# Patient Record
Sex: FEMALE | URBAN - METROPOLITAN AREA
[De-identification: names, ages, dates, MRNs, and addresses within clinical notes are randomized per-mention and may not be internally consistent; named-entity substitution may affect disease eponyms.]

---

## 2018-03-24 ENCOUNTER — EMERGENCY (EMERGENCY)
Facility: HOSPITAL | Age: 43
LOS: 1 days | Discharge: HOME | End: 2018-03-24
Attending: EMERGENCY MEDICINE
Payer: COMMERCIAL

## 2018-03-24 VITALS — HEIGHT: 66 IN | WEIGHT: 154.98 LBS

## 2018-03-24 VITALS
DIASTOLIC BLOOD PRESSURE: 72 MMHG | SYSTOLIC BLOOD PRESSURE: 109 MMHG | TEMPERATURE: 98 F | OXYGEN SATURATION: 98 % | HEART RATE: 74 BPM | RESPIRATION RATE: 17 BRPM

## 2018-03-24 PROCEDURE — 12011 RPR F/E/E/N/L/M 2.5 CM/<: CPT

## 2018-03-24 PROCEDURE — 99283 EMERGENCY DEPT VISIT LOW MDM: CPT | Mod: 25

## 2018-03-24 RX ORDER — LACTOBACILLUS ACIDOPHILUS 100MM CELL
2 CAPSULE ORAL
Qty: 60 | Refills: 0 | OUTPATIENT
Start: 2018-03-24 | End: 2018-04-22

## 2018-03-24 RX ADMIN — Medication 300 MILLIGRAM(S): at 22:37

## 2018-03-24 NOTE — ED PROVIDER NOTE - ATTENDING CONTRIBUTION TO CARE
I, Shady Bearden, performed a face to face bedside interview with this patient regarding history of present illness, review of symptoms and relevant past medical, social and family history.  I completed an independent physical examination. I have communicated the patient’s plan of care and disposition with the ACP.  42 year old female presents after being bitten by dog with abrasions to nose and 1 small laceration that did not involve cartilage, repaired, well appearing and pt can monitor the dog which is UTD on shots

## 2018-03-24 NOTE — ED ADULT NURSE NOTE - OBJECTIVE STATEMENT
A+OX4, reports dog bite to nose.  Nose with teeth marks, dried bld, mild swelling and redness.  Denies any other complaints

## 2018-03-24 NOTE — ED PROVIDER NOTE - CARE PLAN
Principal Discharge DX:	Dog bite, initial encounter  Secondary Diagnosis:	Laceration of nose, initial encounter

## 2018-03-24 NOTE — ED PROVIDER NOTE - SKIN, MLM
1cm laceration to the left nostril, skin otherwise normal color for race, warm, dry and intact. No evidence of rash.

## 2018-03-24 NOTE — ED PROVIDER NOTE - MEDICAL DECISION MAKING DETAILS
Laceration repair, and d/c home. Laceration repair, and d/c home. Pt well appearing, not through cartilage, and dog up to date and it is her dog which she can monitor.

## 2018-03-24 NOTE — ED PROVIDER NOTE - OBJECTIVE STATEMENT
41 y/o female presents to the ED for evaluation of animal bite, onset 2030 tonight. Pt states she was bit by her dog to the nose. Pt states she washed out wound. Pt is UTD on rabies vaccines, tetanus less than 1 year ago. Denies epistaxis, and any other acute symptoms and complaints at this time.

## 2018-04-25 PROBLEM — E03.9 HYPOTHYROIDISM, UNSPECIFIED: Chronic | Status: ACTIVE | Noted: 2018-03-24

## 2018-05-03 ENCOUNTER — APPOINTMENT (OUTPATIENT)
Dept: SURGERY | Facility: CLINIC | Age: 43
End: 2018-05-03
Payer: COMMERCIAL

## 2018-05-03 VITALS
TEMPERATURE: 98.3 F | RESPIRATION RATE: 14 BRPM | DIASTOLIC BLOOD PRESSURE: 67 MMHG | BODY MASS INDEX: 32.98 KG/M2 | HEART RATE: 74 BPM | WEIGHT: 168 LBS | HEIGHT: 60 IN | SYSTOLIC BLOOD PRESSURE: 124 MMHG | OXYGEN SATURATION: 96 %

## 2018-05-03 DIAGNOSIS — Z00.00 ENCOUNTER FOR GENERAL ADULT MEDICAL EXAMINATION W/OUT ABNORMAL FINDINGS: ICD-10-CM

## 2018-05-03 DIAGNOSIS — N64.52 NIPPLE DISCHARGE: ICD-10-CM

## 2018-05-03 PROCEDURE — 99204 OFFICE O/P NEW MOD 45 MIN: CPT

## 2018-05-03 RX ORDER — LEVOTHYROXINE SODIUM 0.05 MG/1
50 TABLET ORAL
Refills: 0 | Status: ACTIVE | COMMUNITY

## 2018-05-03 RX ORDER — ESCITALOPRAM OXALATE 20 MG/1
20 TABLET, FILM COATED ORAL
Refills: 0 | Status: ACTIVE | COMMUNITY

## 2018-05-03 RX ORDER — LEVOCETIRIZINE DIHYDROCHLORIDE 5 MG/1
5 TABLET, FILM COATED ORAL
Refills: 0 | Status: ACTIVE | COMMUNITY

## 2018-05-24 ENCOUNTER — EMERGENCY (EMERGENCY)
Facility: HOSPITAL | Age: 43
LOS: 1 days | Discharge: HOME | End: 2018-05-24
Attending: EMERGENCY MEDICINE
Payer: COMMERCIAL

## 2018-05-24 VITALS
SYSTOLIC BLOOD PRESSURE: 136 MMHG | DIASTOLIC BLOOD PRESSURE: 84 MMHG | RESPIRATION RATE: 20 BRPM | HEART RATE: 94 BPM | OXYGEN SATURATION: 97 % | TEMPERATURE: 98 F

## 2018-05-24 VITALS — HEIGHT: 60 IN | WEIGHT: 173.94 LBS

## 2018-05-24 LAB
ALBUMIN SERPL ELPH-MCNC: 4.2 G/DL — SIGNIFICANT CHANGE UP (ref 3.3–5.2)
ALP SERPL-CCNC: 90 U/L — SIGNIFICANT CHANGE UP (ref 40–120)
ALT FLD-CCNC: 24 U/L — SIGNIFICANT CHANGE UP
ANION GAP SERPL CALC-SCNC: 16 MMOL/L — SIGNIFICANT CHANGE UP (ref 5–17)
APPEARANCE UR: CLEAR — SIGNIFICANT CHANGE UP
AST SERPL-CCNC: 21 U/L — SIGNIFICANT CHANGE UP
BASOPHILS # BLD AUTO: 0 K/UL — SIGNIFICANT CHANGE UP (ref 0–0.2)
BASOPHILS NFR BLD AUTO: 0.3 % — SIGNIFICANT CHANGE UP (ref 0–2)
BILIRUB SERPL-MCNC: <0.2 MG/DL — LOW (ref 0.4–2)
BILIRUB UR-MCNC: NEGATIVE — SIGNIFICANT CHANGE UP
BUN SERPL-MCNC: 8 MG/DL — SIGNIFICANT CHANGE UP (ref 8–20)
CALCIUM SERPL-MCNC: 9.3 MG/DL — SIGNIFICANT CHANGE UP (ref 8.6–10.2)
CHLORIDE SERPL-SCNC: 101 MMOL/L — SIGNIFICANT CHANGE UP (ref 98–107)
CO2 SERPL-SCNC: 25 MMOL/L — SIGNIFICANT CHANGE UP (ref 22–29)
COLOR SPEC: YELLOW — SIGNIFICANT CHANGE UP
CREAT SERPL-MCNC: 0.78 MG/DL — SIGNIFICANT CHANGE UP (ref 0.5–1.3)
DIFF PNL FLD: ABNORMAL
EOSINOPHIL # BLD AUTO: 0.3 K/UL — SIGNIFICANT CHANGE UP (ref 0–0.5)
EOSINOPHIL NFR BLD AUTO: 2.1 % — SIGNIFICANT CHANGE UP (ref 0–6)
EPI CELLS # UR: SIGNIFICANT CHANGE UP
GLUCOSE SERPL-MCNC: 97 MG/DL — SIGNIFICANT CHANGE UP (ref 70–115)
GLUCOSE UR QL: NEGATIVE MG/DL — SIGNIFICANT CHANGE UP
HCG UR QL: NEGATIVE — SIGNIFICANT CHANGE UP
HCT VFR BLD CALC: 40.2 % — SIGNIFICANT CHANGE UP (ref 37–47)
HGB BLD-MCNC: 13.1 G/DL — SIGNIFICANT CHANGE UP (ref 12–16)
KETONES UR-MCNC: NEGATIVE — SIGNIFICANT CHANGE UP
LEUKOCYTE ESTERASE UR-ACNC: NEGATIVE — SIGNIFICANT CHANGE UP
LIDOCAIN IGE QN: 34 U/L — SIGNIFICANT CHANGE UP (ref 22–51)
LYMPHOCYTES # BLD AUTO: 27.5 % — SIGNIFICANT CHANGE UP (ref 20–55)
LYMPHOCYTES # BLD AUTO: 4.1 K/UL — SIGNIFICANT CHANGE UP (ref 1–4.8)
MCHC RBC-ENTMCNC: 30 PG — SIGNIFICANT CHANGE UP (ref 27–31)
MCHC RBC-ENTMCNC: 32.6 G/DL — SIGNIFICANT CHANGE UP (ref 32–36)
MCV RBC AUTO: 92 FL — SIGNIFICANT CHANGE UP (ref 81–99)
MONOCYTES # BLD AUTO: 0.9 K/UL — HIGH (ref 0–0.8)
MONOCYTES NFR BLD AUTO: 5.8 % — SIGNIFICANT CHANGE UP (ref 3–10)
NEUTROPHILS # BLD AUTO: 9.6 K/UL — HIGH (ref 1.8–8)
NEUTROPHILS NFR BLD AUTO: 63.7 % — SIGNIFICANT CHANGE UP (ref 37–73)
NITRITE UR-MCNC: NEGATIVE — SIGNIFICANT CHANGE UP
PH UR: 7 — SIGNIFICANT CHANGE UP (ref 5–8)
PLATELET # BLD AUTO: 308 K/UL — SIGNIFICANT CHANGE UP (ref 150–400)
POTASSIUM SERPL-MCNC: 4.1 MMOL/L — SIGNIFICANT CHANGE UP (ref 3.5–5.3)
POTASSIUM SERPL-SCNC: 4.1 MMOL/L — SIGNIFICANT CHANGE UP (ref 3.5–5.3)
PROT SERPL-MCNC: 7.4 G/DL — SIGNIFICANT CHANGE UP (ref 6.6–8.7)
PROT UR-MCNC: NEGATIVE MG/DL — SIGNIFICANT CHANGE UP
RBC # BLD: 4.37 M/UL — LOW (ref 4.4–5.2)
RBC # FLD: 13.3 % — SIGNIFICANT CHANGE UP (ref 11–15.6)
RBC CASTS # UR COMP ASSIST: SIGNIFICANT CHANGE UP /HPF (ref 0–4)
SODIUM SERPL-SCNC: 142 MMOL/L — SIGNIFICANT CHANGE UP (ref 135–145)
SP GR SPEC: 1 — LOW (ref 1.01–1.02)
UROBILINOGEN FLD QL: NEGATIVE MG/DL — SIGNIFICANT CHANGE UP
WBC # BLD: 15 K/UL — HIGH (ref 4.8–10.8)
WBC # FLD AUTO: 15 K/UL — HIGH (ref 4.8–10.8)
WBC UR QL: SIGNIFICANT CHANGE UP

## 2018-05-24 PROCEDURE — 36415 COLL VENOUS BLD VENIPUNCTURE: CPT

## 2018-05-24 PROCEDURE — 80053 COMPREHEN METABOLIC PANEL: CPT

## 2018-05-24 PROCEDURE — 81025 URINE PREGNANCY TEST: CPT

## 2018-05-24 PROCEDURE — 83690 ASSAY OF LIPASE: CPT

## 2018-05-24 PROCEDURE — 99284 EMERGENCY DEPT VISIT MOD MDM: CPT

## 2018-05-24 PROCEDURE — 74177 CT ABD & PELVIS W/CONTRAST: CPT

## 2018-05-24 PROCEDURE — 96375 TX/PRO/DX INJ NEW DRUG ADDON: CPT

## 2018-05-24 PROCEDURE — 74177 CT ABD & PELVIS W/CONTRAST: CPT | Mod: 26

## 2018-05-24 PROCEDURE — 85027 COMPLETE CBC AUTOMATED: CPT

## 2018-05-24 PROCEDURE — 81001 URINALYSIS AUTO W/SCOPE: CPT

## 2018-05-24 PROCEDURE — 96374 THER/PROPH/DIAG INJ IV PUSH: CPT | Mod: XU

## 2018-05-24 PROCEDURE — 99284 EMERGENCY DEPT VISIT MOD MDM: CPT | Mod: 25

## 2018-05-24 RX ORDER — ONDANSETRON 8 MG/1
4 TABLET, FILM COATED ORAL ONCE
Qty: 0 | Refills: 0 | Status: COMPLETED | OUTPATIENT
Start: 2018-05-24 | End: 2018-05-24

## 2018-05-24 RX ORDER — SODIUM CHLORIDE 9 MG/ML
1000 INJECTION INTRAMUSCULAR; INTRAVENOUS; SUBCUTANEOUS ONCE
Qty: 0 | Refills: 0 | Status: COMPLETED | OUTPATIENT
Start: 2018-05-24 | End: 2018-05-24

## 2018-05-24 RX ORDER — ACETAMINOPHEN 500 MG
1000 TABLET ORAL ONCE
Qty: 0 | Refills: 0 | Status: COMPLETED | OUTPATIENT
Start: 2018-05-24 | End: 2018-05-24

## 2018-05-24 RX ORDER — OMEPRAZOLE AND SODIUM BICARBONATE 40; 1100 MG/1; MG/1
1 CAPSULE, GELATIN COATED ORAL
Qty: 0 | Refills: 0 | COMMUNITY

## 2018-05-24 RX ORDER — SODIUM CHLORIDE 9 MG/ML
1000 INJECTION INTRAMUSCULAR; INTRAVENOUS; SUBCUTANEOUS
Qty: 0 | Refills: 0 | Status: DISCONTINUED | OUTPATIENT
Start: 2018-05-24 | End: 2018-05-24

## 2018-05-24 RX ORDER — LEVOTHYROXINE SODIUM 125 MCG
0 TABLET ORAL
Qty: 0 | Refills: 0 | COMMUNITY

## 2018-05-24 RX ORDER — ESCITALOPRAM OXALATE 10 MG/1
0 TABLET, FILM COATED ORAL
Qty: 0 | Refills: 0 | COMMUNITY

## 2018-05-24 RX ADMIN — ONDANSETRON 4 MILLIGRAM(S): 8 TABLET, FILM COATED ORAL at 18:07

## 2018-05-24 RX ADMIN — Medication 400 MILLIGRAM(S): at 18:07

## 2018-05-24 RX ADMIN — Medication 1000 MILLIGRAM(S): at 18:07

## 2018-05-24 RX ADMIN — SODIUM CHLORIDE 1000 MILLILITER(S): 9 INJECTION INTRAMUSCULAR; INTRAVENOUS; SUBCUTANEOUS at 18:06

## 2018-05-24 NOTE — ED PROVIDER NOTE - ABDOMINAL EXAM
ROVSING'S SIGN/soft/MCBURNEY'S POINT TENDERNESS/tender.../PSOAS SIGN tender.../ROVSING'S SIGN/soft/MCBURNEY'S POINT TENDERNESS/nondistended/PSOAS SIGN

## 2018-05-24 NOTE — ED PROVIDER NOTE - ATTENDING CONTRIBUTION TO CARE
right sided abdominal pain x two days burning in character   x two days with nausea    RLQ tenderness + psoas    ct and labs    reeval

## 2018-05-24 NOTE — ED PROVIDER NOTE - OBJECTIVE STATEMENT
41 yo female with a pmh of hypothyroid, GERD, and depression present with a two day history of abdominal pain. She states that the pain is located across her upper abdomen and to her R upper and lower quadrant. The pain wraps around to the back and is described as a burning/aching pain. The severity of the pain is intermittent and she cannot pinpoint anything that aggravates or relieves the pain. She rates the pain a 8/10. She has been experiencing nausea but no episodes of vomiting. Her stool has been lose and she denies any blood or abnormally darkened stool. She had not had any recent illness or travel. She denies any other symptoms including fever, chills, chest pain, or SOB.

## 2018-05-24 NOTE — ED PROVIDER NOTE - MEDICAL DECISION MAKING DETAILS
43 yo female with abdominal pain. Labs and CT ordered to screen for appendicitis and pancreatitis. Meds given for nausea and pain.

## 2018-11-15 ENCOUNTER — APPOINTMENT (OUTPATIENT)
Dept: SURGERY | Facility: CLINIC | Age: 43
End: 2018-11-15
Payer: COMMERCIAL

## 2018-11-15 VITALS
HEART RATE: 77 BPM | WEIGHT: 165 LBS | RESPIRATION RATE: 14 BRPM | HEIGHT: 60 IN | OXYGEN SATURATION: 97 % | BODY MASS INDEX: 32.39 KG/M2 | SYSTOLIC BLOOD PRESSURE: 125 MMHG | DIASTOLIC BLOOD PRESSURE: 78 MMHG

## 2018-11-15 DIAGNOSIS — Z86.59 PERSONAL HISTORY OF OTHER MENTAL AND BEHAVIORAL DISORDERS: ICD-10-CM

## 2018-11-15 DIAGNOSIS — Z72.0 TOBACCO USE: ICD-10-CM

## 2018-11-15 DIAGNOSIS — Z86.39 PERSONAL HISTORY OF OTHER ENDOCRINE, NUTRITIONAL AND METABOLIC DISEASE: ICD-10-CM

## 2018-11-15 DIAGNOSIS — Z78.9 OTHER SPECIFIED HEALTH STATUS: ICD-10-CM

## 2018-11-15 DIAGNOSIS — Z80.8 FAMILY HISTORY OF MALIGNANT NEOPLASM OF OTHER ORGANS OR SYSTEMS: ICD-10-CM

## 2018-11-15 DIAGNOSIS — Z87.898 PERSONAL HISTORY OF OTHER SPECIFIED CONDITIONS: ICD-10-CM

## 2018-11-15 DIAGNOSIS — Z80.0 FAMILY HISTORY OF MALIGNANT NEOPLASM OF DIGESTIVE ORGANS: ICD-10-CM

## 2018-11-15 DIAGNOSIS — J30.1 ALLERGIC RHINITIS DUE TO POLLEN: ICD-10-CM

## 2018-11-15 PROCEDURE — 99214 OFFICE O/P EST MOD 30 MIN: CPT

## 2018-12-12 PROBLEM — Z80.0 FAMILY HISTORY OF THROAT CANCER: Status: ACTIVE | Noted: 2018-05-03

## 2018-12-12 PROBLEM — Z80.8 FAMILY HISTORY OF MALIGNANT NEOPLASM OF TONGUE: Status: ACTIVE | Noted: 2018-05-03

## 2018-12-12 PROBLEM — Z86.39 HISTORY OF THYROID DISORDER: Status: RESOLVED | Noted: 2018-05-03 | Resolved: 2018-12-12

## 2018-12-12 PROBLEM — Z72.0 TOBACCO USE: Status: ACTIVE | Noted: 2018-05-03

## 2018-12-12 PROBLEM — J30.1 ALLERGIC RHINITIS DUE TO POLLEN: Status: RESOLVED | Noted: 2018-05-03 | Resolved: 2018-12-12

## 2018-12-12 PROBLEM — Z86.59 HISTORY OF ANXIETY: Status: RESOLVED | Noted: 2018-05-03 | Resolved: 2018-12-12

## 2018-12-12 PROBLEM — Z86.39 HISTORY OF HYPOTHYROIDISM: Status: RESOLVED | Noted: 2018-05-03 | Resolved: 2018-12-12

## 2018-12-12 PROBLEM — Z78.9 CAFFEINE USE: Status: ACTIVE | Noted: 2018-05-03

## 2018-12-12 PROBLEM — Z86.59 HISTORY OF DEPRESSION: Status: RESOLVED | Noted: 2018-05-03 | Resolved: 2018-12-12

## 2018-12-12 PROBLEM — Z87.898 HISTORY OF HEARTBURN: Status: RESOLVED | Noted: 2018-05-03 | Resolved: 2018-12-12

## 2018-12-12 PROBLEM — Z80.0 FAMILY HISTORY OF MALIGNANT NEOPLASM OF COLON: Status: ACTIVE | Noted: 2018-05-03

## 2019-04-28 DIAGNOSIS — N64.89 OTHER SPECIFIED DISORDERS OF BREAST: ICD-10-CM

## 2019-05-01 PROBLEM — N64.89 BREAST ASYMMETRY: Status: ACTIVE | Noted: 2019-05-01

## 2019-06-11 ENCOUNTER — APPOINTMENT (OUTPATIENT)
Dept: SURGERY | Facility: CLINIC | Age: 44
End: 2019-06-11
Payer: COMMERCIAL

## 2019-06-11 VITALS
HEIGHT: 60 IN | WEIGHT: 171 LBS | BODY MASS INDEX: 33.57 KG/M2 | DIASTOLIC BLOOD PRESSURE: 75 MMHG | SYSTOLIC BLOOD PRESSURE: 111 MMHG | HEART RATE: 72 BPM

## 2019-06-11 PROCEDURE — 99214 OFFICE O/P EST MOD 30 MIN: CPT

## 2019-06-27 NOTE — HISTORY OF PRESENT ILLNESS
[FreeTextEntry1] : Referring Physician: Dr. Mike Martinez\par I had the pleasure of seeing FRANCA MARTÍNEZ  in the office today for a follow up clinical breast exam and to review recent screening imaging.  \par \par Franca is a  teddy premenopausal female who is overall good health.  She states she has been have feeing of right breast pain and engorgement feeling.  She stated she could not take it anymore and when she manually manipulated her right nipple bloody discharge was expressed.  She sought medical attention an underwent mammogram/ultrasound.  Imaging was benign and she was referred to a see a breast surgeon.\par \par She denies dominant breast mass, skin changes or nipple dimpling.  \par \par  with 1st delivery at  33.  Menses began at age 13.\par She denies personal history of malignancy.\par Family history is significant for Maunt diagnosed with colon cancer, MGM diagnosed with breast cancer, maternal great aunt diagnosed with breast cancer, and father diagnosed with head/neck/ throat and bladder cancer.\par \par Tyrer Cuzick score 14.7%\par \par Imaging:  [facility]\par 2019  digital bilateral screening mammogram with CAD and tomosynthesis and breast ultrasound\par Impression:  Posterior lateral right breast asymmetry.  BIRADS0 incomplete\par \par 2019  digital unilateral right diagnostic callback mammogram and tomosynthesis and breast ultrasound\par Impression:  No mammographic or US evidence of malignancy  BIRADS 2 benign findnigs\par \par We reviewed clinical examination and recent imaging.  Franca understands mammogram demonstrated a asymmetry in the lateral right breast on screening mammogram and there was no correlate on sonogram.  She then underwent repeat imaging and right breast asymmetry on targeted mammogram was demonstrated as superimposition of normal breast tissue.  Repeat mammogram/sonogram was benign and clinical breast exam is benign.  Recommendation for follow up in 6 months for CBE.  She understands and agrees to plan.\par \par

## 2019-06-27 NOTE — ASSESSMENT
[FreeTextEntry1] : 43 premenopausal with right breast engorgement prior to menses presents for clinical breast exam.  Screening mammogram demonstrated right lateral right breast on screening mammogram and there was no correlate on sonogram.  She then underwent repeat imaging and right breast asymmetry on targeted mammogram was demonstrated as superimposition of normal breast tissue.  Repeat mammogram/sonogram was benign and clinical breast exam is benign.  Recommendation for follow up in 6 months for CBE.\par 1.  Follow up in 6 months\par 2.  Annual screening mammogram and sonogram 4/29/2020

## 2019-06-27 NOTE — PHYSICAL EXAM
[Normocephalic] : normocephalic [Atraumatic] : atraumatic [Sclera nonicteric] : sclera nonicteric [PERRL] : pupils equal, round and reactive to light [No Supraclavicular Adenopathy] : no supraclavicular adenopathy [Supple] : supple [Examined in the supine and seated position] : examined in the supine and seated position [No dominant masses] : no dominant masses in right breast  [No dominant masses] : no dominant masses left breast [No Nipple Retraction] : no left nipple retraction [No Nipple Discharge] : no left nipple discharge [Breast Abnormal Secretion Bloody Fluid Right] : bloody discharge [No Axillary Lymphadenopathy] : no left axillary lymphadenopathy [No Edema] : no edema [No Rashes] : no rashes [No Ulceration] : no ulceration [Breast Nipple Inversion] : nipples not inverted [Breast Abnormal Secretion Bloody Fluid Left] : no bloody discharge [Breast Nipple Flattening] : nipples not flattened [Breast Nipple Retraction] : nipples not retracted [Breast Abnormal Lactation (Galactorrhea)] : no galactorrhea [Breast Abnormal Secretion Serous Fluid] : no serous discharge [Breast Nipple Fissures] : nipples not fissured [de-identified] : No supraclavicular or axillary adenopathy. No dominant masses, normal to palpation. Everted nipple without discharge. No skin changes. \par  [Breast Abnormal Secretion Opalescent Fluid] : no milky discharge [de-identified] : No supraclavicular or axillary adenopathy. No dominant masses, normal to palpation. Everted nipple without discharge. No skin changes.\par

## 2019-12-10 ENCOUNTER — APPOINTMENT (OUTPATIENT)
Dept: SURGERY | Facility: CLINIC | Age: 44
End: 2019-12-10

## 2020-04-28 ENCOUNTER — APPOINTMENT (OUTPATIENT)
Dept: SURGERY | Facility: CLINIC | Age: 45
End: 2020-04-28

## 2020-05-14 ENCOUNTER — RESULT REVIEW (OUTPATIENT)
Age: 45
End: 2020-05-14

## 2020-06-05 ENCOUNTER — TRANSCRIPTION ENCOUNTER (OUTPATIENT)
Age: 45
End: 2020-06-05

## 2020-07-24 ENCOUNTER — APPOINTMENT (OUTPATIENT)
Dept: BREAST CENTER | Facility: CLINIC | Age: 45
End: 2020-07-24
Payer: COMMERCIAL

## 2020-07-24 DIAGNOSIS — Z12.39 ENCOUNTER FOR OTHER SCREENING FOR MALIGNANT NEOPLASM OF BREAST: ICD-10-CM

## 2020-07-24 PROCEDURE — 99212 OFFICE O/P EST SF 10 MIN: CPT | Mod: 95

## 2020-07-24 NOTE — ASSESSMENT
[FreeTextEntry1] : 44 premenopausal with history of right breast engorgement/pain presents for review of screening imaging.  Imaging is benign and she has no new reporting to breasts.  Recommendation for follow up in 1 year and continue annual screening imaging.\par 1.  Follow up in 1 year\par 2.  Annual screening mammogram and sonogram 7/21/2021

## 2020-07-24 NOTE — REASON FOR VISIT
[Follow-Up: _____] : a [unfilled] follow-up visit [FreeTextEntry1] : history with right breast pain and engorgement

## 2020-07-24 NOTE — HISTORY OF PRESENT ILLNESS
[Home] : at home, [unfilled] , at the time of the visit. [Verbal consent obtained from patient] : the patient, [unfilled] [Medical Office: ([facility])___] : at the medical office located in  [FreeTextEntry1] : Referring Physician: Dr. Mike Martinez\par I had the pleasure of seeing LM MARTÍNEZ  via telehealth for a follow up and to review recent screening imaging.  \par \par Lm is a  teddy premenopausal female who is overall good health.  She states she has been have feeing of right breast pain and engorgement feeling.  She stated she could not take it anymore and when she manually manipulated her right nipple bloody discharge was expressed.  She sought medical attention an underwent mammogram/ultrasound.  Imaging was benign and she was referred to a see a breast surgeon.\par \par She denies dominant breast mass, skin changes or nipple dimpling.  \par \par  with 1st delivery at  33.  Menses began at age 13.\par She denies personal history of malignancy.\par Family history is significant for Maunt diagnosed with colon cancer, MGM diagnosed with breast cancer, maternal great aunt diagnosed with breast cancer, and father diagnosed with head/neck/ throat and bladder cancer.\par \par Rosana Iglesiaszick score 14.7%\par \par 2020  screening mammogram and sonogram\par Impression:  No mammographic or sonographic evidence of malignancy.  BIRADS 1 negative\par \par We reviewed imaging and she understands imaging is benign today.  She does not report any changes to either breast.  Recommendation for follow up in 1 year and continue annual screening mammogram.  All questions answered.\par

## 2021-03-06 ENCOUNTER — TRANSCRIPTION ENCOUNTER (OUTPATIENT)
Age: 46
End: 2021-03-06

## 2021-03-22 ENCOUNTER — TRANSCRIPTION ENCOUNTER (OUTPATIENT)
Age: 46
End: 2021-03-22

## 2021-05-21 ENCOUNTER — RESULT REVIEW (OUTPATIENT)
Age: 46
End: 2021-05-21

## 2021-07-16 ENCOUNTER — TRANSCRIPTION ENCOUNTER (OUTPATIENT)
Age: 46
End: 2021-07-16

## 2022-03-14 NOTE — ED PROVIDER NOTE - NS ED ATTENDING STATEMENT MOD
[Fully active, able to carry on all pre-disease performance without restriction] : Status 0 - Fully active, able to carry on all pre-disease performance without restriction [Thin] : thin [Normal] : affect appropriate I have personally performed a face to face diagnostic evaluation on this patient. I have reviewed the ACP note and agree with the history, exam and plan of care, except as noted.

## 2022-04-19 ENCOUNTER — TRANSCRIPTION ENCOUNTER (OUTPATIENT)
Age: 47
End: 2022-04-19

## 2022-05-20 ENCOUNTER — NON-APPOINTMENT (OUTPATIENT)
Age: 47
End: 2022-05-20

## 2022-06-02 ENCOUNTER — APPOINTMENT (OUTPATIENT)
Dept: MRI IMAGING | Facility: CLINIC | Age: 47
End: 2022-06-02
Payer: COMMERCIAL

## 2022-06-02 ENCOUNTER — RESULT REVIEW (OUTPATIENT)
Age: 47
End: 2022-06-02

## 2022-06-02 ENCOUNTER — APPOINTMENT (OUTPATIENT)
Dept: ORTHOPEDIC SURGERY | Facility: CLINIC | Age: 47
End: 2022-06-02
Payer: COMMERCIAL

## 2022-06-02 ENCOUNTER — NON-APPOINTMENT (OUTPATIENT)
Age: 47
End: 2022-06-02

## 2022-06-02 ENCOUNTER — OUTPATIENT (OUTPATIENT)
Dept: OUTPATIENT SERVICES | Facility: HOSPITAL | Age: 47
LOS: 1 days | End: 2022-06-02
Payer: COMMERCIAL

## 2022-06-02 DIAGNOSIS — S76.012A STRAIN OF MUSCLE, FASCIA AND TENDON OF LEFT HIP, INITIAL ENCOUNTER: ICD-10-CM

## 2022-06-02 PROCEDURE — 73721 MRI JNT OF LWR EXTRE W/O DYE: CPT | Mod: 26,LT

## 2022-06-02 PROCEDURE — 73721 MRI JNT OF LWR EXTRE W/O DYE: CPT

## 2022-06-02 PROCEDURE — 73503 X-RAY EXAM HIP UNI 4/> VIEWS: CPT

## 2022-06-02 PROCEDURE — 99204 OFFICE O/P NEW MOD 45 MIN: CPT

## 2022-06-03 NOTE — DISCUSSION/SUMMARY
[de-identified] : LM MARTÍNEZ is a 46 year female being seen for initial visit L hip pain.  Patient is here after being referred by Dr. Berrios's PA (rudy).  She states over the last 6 or so months she has significantly increased her exercise routine.  She walks 4 miles every night.  She has lost 40 pounds intentionally.  She was seen and evaluated and sent for an intra-articular hip injection.  She states this offered her 0 relief.  She complains of diffuse groin lateral and posterior hip pain.  It is worse with walking.  She also has worse pain with sitting for long periods of time.  She denies trauma or injury history.  She denies paresthesias currently but occasionally has radicular pain from her lower back down to her feet.\par \par We had a thorough discussion regarding the nature of her pain, the pathophysiology, as well as all treatment options. Based on her clinical exam, and radiographs, there is concern for stress fx or tearing of pubofemoral ligament or internal derangement, or ischiofemoral impingement. Considering the patient's current presentation of pain being worse than prior to corticosteroid injection and failing on greater than 3 months of conservative measures, an MRI is indicated at this time. A prescription for this was given to the patient. We will go over the MRI results with her upon obtaining the results in the office and advise her of further treatment options. She agrees with the above plan and all questions were answered.\par \par \par

## 2022-06-03 NOTE — PHYSICAL EXAM
[de-identified] : Physical Exam:\par General: Well appearing, no acute distress\par Neurologic: A&Ox3, No focal deficits\par Head: NCAT without abrasions, lacerations, or ecchymosis to head, face, or scalp\par Eyes: No scleral icterus, no gross abnormalities\par Respiratory: Equal chest wall expansion bilaterally, no accessory muscle use\par Lymphatic: No lymphadenopathy palpated\par Skin: Warm and dry\par Psychiatric: Normal mood and affect\par \par Left hip Exam\par \par ·	Skin: Clean/dry and intact\par ·	Inspection: No obvious deformity, no swelling.\par ·	Pulses: 2+ DP/PT pulses\par ·	ROM: 130 flexion without pain. Internal rotation to 15 with pain in gluteus. External rotation to 45 pain in hip flexor. \par ·	Painful ROM: None\par ·	Tenderness: No tenderness over greater trochanter/glut medius insertion. No groin pain. No ttp over the ASIS/Illiac crest.\par ·	Strength: 4++/5 ADD/ABD/Q/H/TA/GS/EHL\par ·	Neuro: Sensation in tact to light touch throughout, DTR normal\par ·	Additional tests: positive impingement test, weakness in gluteus medius. positive Trendelenburg. no signs of sport hernia.  [de-identified] : 2 views of L hip were performed today and available for me to review. Results were discussed with the patient. They demonstrate no FA joint space narrowing, no hip dysplasia, no f/x, dislocation or other deformity.\par \par 3 views of pelvis were performed today and available for me to review. Results were discussed with the patient. They demonstrate no f/x, dislocation or other deformity.\par

## 2022-06-03 NOTE — HISTORY OF PRESENT ILLNESS
[Worsening] : worsening [___ mths] : [unfilled] month(s) ago [4] : a current pain level of 4/10 [6] : an average pain level of 6/10 [Bending] : worsened by bending [Sitting] : worsened by sitting [None] : No relieving factors are noted [de-identified] : LM MARTÍNEZ is a 46 year female being seen for initial visit L hip pain.  Patient is here after being referred by Dr. Berrios's PA (rudy).  She states over the last 6 or so months she has significantly increased her exercise routine.  She walks 4 miles every night.  She has lost 40 pounds intentionally.  She was seen and evaluated and sent for an intra-articular hip injection.  She states this offered her 0 relief.  She complains of diffuse groin lateral and posterior hip pain.  It is worse with walking.  She also has worse pain with sitting for long periods of time.  She denies trauma or injury history.  She denies paresthesias currently but occasionally has radicular pain from her lower back down to her feet.

## 2022-06-03 NOTE — ADDENDUM
[FreeTextEntry1] : Documented by Alexis Barboza acting as a scribe for Dr. Salcido and Braulio Fischer PA-C on 06/02/2022. \par \par All medical record entries made by the Scribe were at my, Dr. Salcido, and Braulio Fischer's, direction and\par personally dictated by me on 06/02/2022. I have reviewed the chart and agree that the record\par accurately reflects my personal performance of the history, physical exam, procedure and imaging.

## 2022-06-09 ENCOUNTER — APPOINTMENT (OUTPATIENT)
Dept: ORTHOPEDIC SURGERY | Facility: CLINIC | Age: 47
End: 2022-06-09
Payer: COMMERCIAL

## 2022-06-09 DIAGNOSIS — S76.012A STRAIN OF MUSCLE, FASCIA AND TENDON OF LEFT HIP, INITIAL ENCOUNTER: ICD-10-CM

## 2022-06-09 DIAGNOSIS — S76.912A STRAIN OF MUSCLE, FASCIA AND TENDON OF LEFT HIP, INITIAL ENCOUNTER: ICD-10-CM

## 2022-06-09 PROCEDURE — 99442: CPT

## 2022-11-30 ENCOUNTER — NON-APPOINTMENT (OUTPATIENT)
Age: 47
End: 2022-11-30

## 2023-08-03 ENCOUNTER — OFFICE (OUTPATIENT)
Dept: URBAN - METROPOLITAN AREA CLINIC 113 | Facility: CLINIC | Age: 48
Setting detail: OPHTHALMOLOGY
End: 2023-08-03
Payer: COMMERCIAL

## 2023-08-03 VITALS — HEIGHT: 60 IN | WEIGHT: 168 LBS | BODY MASS INDEX: 32.98 KG/M2

## 2023-08-03 DIAGNOSIS — H04.123: ICD-10-CM

## 2023-08-03 DIAGNOSIS — E11.9: ICD-10-CM

## 2023-08-03 DIAGNOSIS — H02.822: ICD-10-CM

## 2023-08-03 DIAGNOSIS — H43.393: ICD-10-CM

## 2023-08-03 PROCEDURE — 92002 INTRM OPH EXAM NEW PATIENT: CPT | Performed by: OPHTHALMOLOGY

## 2023-08-03 PROCEDURE — 92250 FUNDUS PHOTOGRAPHY W/I&R: CPT | Performed by: OPHTHALMOLOGY

## 2023-08-03 ASSESSMENT — REFRACTION_CURRENTRX
OD_ADD: +1.50
OD_CYLINDER: -0.50
OD_AXIS: 088
OS_SPHERE: +0.50
OD_OVR_VA: 20/
OD_SPHERE: +1.00
OD_VPRISM_DIRECTION: PROGS
OS_OVR_VA: 20/
OS_CYLINDER: -0.50
OS_VPRISM_DIRECTION: PROGS
OS_AXIS: 150
OS_ADD: +1.50

## 2023-08-03 ASSESSMENT — TONOMETRY
OS_IOP_MMHG: 18
OD_IOP_MMHG: 18

## 2023-08-03 ASSESSMENT — REFRACTION_AUTOREFRACTION
OS_SPHERE: +0.50
OS_AXIS: 122
OD_CYLINDER: -0.50
OS_CYLINDER: -0.50
OD_SPHERE: +1.00
OD_AXIS: 090

## 2023-08-03 ASSESSMENT — KERATOMETRY
OD_K1POWER_DIOPTERS: 44.25
OS_K1POWER_DIOPTERS: 44.50
OS_K2POWER_DIOPTERS: 44.75
OD_AXISANGLE_DEGREES: 048
OS_AXISANGLE_DEGREES: 087
OD_K2POWER_DIOPTERS: 44.50

## 2023-08-03 ASSESSMENT — CONFRONTATIONAL VISUAL FIELD TEST (CVF)
OD_FINDINGS: FULL
OS_FINDINGS: FULL

## 2023-08-03 ASSESSMENT — AXIALLENGTH_DERIVED
OD_AL: 22.9939
OS_AL: 23.0921

## 2023-08-03 ASSESSMENT — SPHEQUIV_DERIVED
OS_SPHEQUIV: 0.25
OD_SPHEQUIV: 0.75

## 2023-08-03 ASSESSMENT — VISUAL ACUITY
OD_BCVA: 20/20-1
OS_BCVA: 20/20-1

## 2023-08-03 ASSESSMENT — SUPERFICIAL PUNCTATE KERATITIS (SPK)
OD_SPK: 1+
OS_SPK: 1+

## 2023-08-11 ENCOUNTER — OFFICE (OUTPATIENT)
Dept: URBAN - METROPOLITAN AREA CLINIC 94 | Facility: CLINIC | Age: 48
Setting detail: OPHTHALMOLOGY
End: 2023-08-11
Payer: COMMERCIAL

## 2023-08-11 DIAGNOSIS — H02.822: ICD-10-CM

## 2023-08-11 PROCEDURE — 92285 EXTERNAL OCULAR PHOTOGRAPHY: CPT | Performed by: OPHTHALMOLOGY

## 2023-08-11 PROCEDURE — 92012 INTRM OPH EXAM EST PATIENT: CPT | Performed by: OPHTHALMOLOGY

## 2023-08-11 ASSESSMENT — KERATOMETRY
OS_AXISANGLE_DEGREES: 087
OS_K1POWER_DIOPTERS: 44.50
OD_K1POWER_DIOPTERS: 44.25
OD_K2POWER_DIOPTERS: 44.50
OD_AXISANGLE_DEGREES: 048
OS_K2POWER_DIOPTERS: 44.75

## 2023-08-11 ASSESSMENT — CONFRONTATIONAL VISUAL FIELD TEST (CVF)
OS_FINDINGS: FULL
OD_FINDINGS: FULL

## 2023-08-11 ASSESSMENT — REFRACTION_CURRENTRX
OS_SPHERE: +0.50
OD_AXIS: 088
OD_CYLINDER: -0.50
OD_VPRISM_DIRECTION: PROGS
OD_ADD: +1.50
OD_OVR_VA: 20/
OS_VPRISM_DIRECTION: PROGS
OD_SPHERE: +1.00
OS_ADD: +1.50
OS_AXIS: 150
OS_OVR_VA: 20/
OS_CYLINDER: -0.50

## 2023-08-11 ASSESSMENT — VISUAL ACUITY
OD_BCVA: 20/20
OS_BCVA: 20/20-1

## 2023-08-11 ASSESSMENT — REFRACTION_AUTOREFRACTION
OD_SPHERE: +1.00
OS_AXIS: 122
OD_AXIS: 090
OD_CYLINDER: -0.50
OS_SPHERE: +0.50
OS_CYLINDER: -0.50

## 2023-08-11 ASSESSMENT — AXIALLENGTH_DERIVED
OS_AL: 23.0921
OD_AL: 22.9939

## 2023-08-11 ASSESSMENT — TONOMETRY
OS_IOP_MMHG: 15
OD_IOP_MMHG: 17

## 2023-08-11 ASSESSMENT — SUPERFICIAL PUNCTATE KERATITIS (SPK)
OS_SPK: 1+
OD_SPK: 1+

## 2023-08-11 ASSESSMENT — SPHEQUIV_DERIVED
OD_SPHEQUIV: 0.75
OS_SPHEQUIV: 0.25

## 2023-08-24 ENCOUNTER — RX ONLY (RX ONLY)
Age: 48
End: 2023-08-24

## 2023-08-24 ENCOUNTER — OFFICE (OUTPATIENT)
Dept: URBAN - METROPOLITAN AREA CLINIC 113 | Facility: CLINIC | Age: 48
Setting detail: OPHTHALMOLOGY
End: 2023-08-24
Payer: COMMERCIAL

## 2023-08-24 DIAGNOSIS — H04.123: ICD-10-CM

## 2023-08-24 DIAGNOSIS — H02.822: ICD-10-CM

## 2023-08-24 PROBLEM — H43.393 VITREOUS FLOATERS; BOTH EYES: Status: ACTIVE | Noted: 2023-08-03

## 2023-08-24 PROBLEM — E11.9 DIABETES TYPE 2 NO RETINOPATHY: Status: ACTIVE | Noted: 2023-08-03

## 2023-08-24 PROCEDURE — 92012 INTRM OPH EXAM EST PATIENT: CPT | Performed by: OPHTHALMOLOGY

## 2023-08-24 PROCEDURE — 67840 REMOVE EYELID LESION: CPT | Performed by: OPHTHALMOLOGY

## 2023-08-24 ASSESSMENT — TONOMETRY
OD_IOP_MMHG: 16
OS_IOP_MMHG: 17

## 2023-08-24 ASSESSMENT — REFRACTION_AUTOREFRACTION
OS_SPHERE: +0.50
OD_SPHERE: +1.00
OD_CYLINDER: -0.50
OS_CYLINDER: -0.50
OS_AXIS: 122
OD_AXIS: 090

## 2023-08-24 ASSESSMENT — CONFRONTATIONAL VISUAL FIELD TEST (CVF)
OS_FINDINGS: FULL
OD_FINDINGS: FULL

## 2023-08-24 ASSESSMENT — REFRACTION_CURRENTRX
OD_OVR_VA: 20/
OS_VPRISM_DIRECTION: PROGS
OD_VPRISM_DIRECTION: PROGS
OS_AXIS: 150
OD_CYLINDER: -0.50
OS_CYLINDER: -0.50
OD_ADD: +1.50
OD_SPHERE: +1.00
OS_OVR_VA: 20/
OS_ADD: +1.50
OD_AXIS: 088
OS_SPHERE: +0.50

## 2023-08-24 ASSESSMENT — KERATOMETRY
OD_K1POWER_DIOPTERS: 44.25
OS_AXISANGLE_DEGREES: 087
OS_K2POWER_DIOPTERS: 44.75
OD_AXISANGLE_DEGREES: 048
OD_K2POWER_DIOPTERS: 44.50
OS_K1POWER_DIOPTERS: 44.50

## 2023-08-24 ASSESSMENT — AXIALLENGTH_DERIVED
OD_AL: 22.9939
OS_AL: 23.0921

## 2023-08-24 ASSESSMENT — VISUAL ACUITY
OD_BCVA: 20/20
OS_BCVA: 20/25+1

## 2023-08-24 ASSESSMENT — SUPERFICIAL PUNCTATE KERATITIS (SPK)
OD_SPK: 1+
OS_SPK: 1+

## 2023-08-24 ASSESSMENT — SPHEQUIV_DERIVED
OD_SPHEQUIV: 0.75
OS_SPHEQUIV: 0.25

## 2023-09-12 ENCOUNTER — OFFICE (OUTPATIENT)
Dept: URBAN - METROPOLITAN AREA CLINIC 115 | Facility: CLINIC | Age: 48
Setting detail: OPHTHALMOLOGY
End: 2023-09-12
Payer: COMMERCIAL

## 2023-09-12 DIAGNOSIS — H02.822: ICD-10-CM

## 2023-09-12 DIAGNOSIS — H04.123: ICD-10-CM

## 2023-09-12 PROCEDURE — 99213 OFFICE O/P EST LOW 20 MIN: CPT | Performed by: OPHTHALMOLOGY

## 2023-09-12 ASSESSMENT — SPHEQUIV_DERIVED
OD_SPHEQUIV: 0.5
OS_SPHEQUIV: 0.25

## 2023-09-12 ASSESSMENT — TONOMETRY
OD_IOP_MMHG: 17
OS_IOP_MMHG: 17

## 2023-09-12 ASSESSMENT — REFRACTION_CURRENTRX
OD_AXIS: 088
OD_CYLINDER: -0.50
OS_SPHERE: +0.50
OD_OVR_VA: 20/
OD_ADD: +1.50
OD_SPHERE: +1.00
OS_AXIS: 150
OS_ADD: +1.50
OD_VPRISM_DIRECTION: PROGS
OS_OVR_VA: 20/
OS_CYLINDER: -0.50
OS_VPRISM_DIRECTION: PROGS

## 2023-09-12 ASSESSMENT — CONFRONTATIONAL VISUAL FIELD TEST (CVF)
OD_FINDINGS: FULL
OS_FINDINGS: FULL

## 2023-09-12 ASSESSMENT — SUPERFICIAL PUNCTATE KERATITIS (SPK)
OS_SPK: 1+
OD_SPK: 1+

## 2023-09-12 ASSESSMENT — KERATOMETRY
OS_AXISANGLE_DEGREES: 087
OS_K1POWER_DIOPTERS: 44.50
OD_K1POWER_DIOPTERS: 44.25
OD_K2POWER_DIOPTERS: 44.50
OS_K2POWER_DIOPTERS: 44.75
OD_AXISANGLE_DEGREES: 048

## 2023-09-12 ASSESSMENT — REFRACTION_AUTOREFRACTION
OS_AXIS: 116
OD_CYLINDER: -0.50
OS_SPHERE: +0.50
OS_CYLINDER: -0.50
OD_SPHERE: +0.75
OD_AXIS: 098

## 2023-09-12 ASSESSMENT — VISUAL ACUITY
OS_BCVA: 20/25
OD_BCVA: 20/20

## 2023-09-12 ASSESSMENT — AXIALLENGTH_DERIVED
OD_AL: 23.0868
OS_AL: 23.0921

## 2023-11-12 ENCOUNTER — NON-APPOINTMENT (OUTPATIENT)
Age: 48
End: 2023-11-12

## 2023-11-15 ENCOUNTER — NON-APPOINTMENT (OUTPATIENT)
Age: 48
End: 2023-11-15

## 2025-02-11 ENCOUNTER — NON-APPOINTMENT (OUTPATIENT)
Age: 50
End: 2025-02-11